# Patient Record
Sex: MALE | Race: WHITE
[De-identification: names, ages, dates, MRNs, and addresses within clinical notes are randomized per-mention and may not be internally consistent; named-entity substitution may affect disease eponyms.]

---

## 2019-03-27 ENCOUNTER — HOSPITAL ENCOUNTER (EMERGENCY)
Dept: HOSPITAL 62 - ER | Age: 59
LOS: 1 days | Discharge: HOME | End: 2019-03-28
Payer: COMMERCIAL

## 2019-03-27 DIAGNOSIS — N13.2: Primary | ICD-10-CM

## 2019-03-27 DIAGNOSIS — R10.9: ICD-10-CM

## 2019-03-27 DIAGNOSIS — R00.0: ICD-10-CM

## 2019-03-27 DIAGNOSIS — I10: ICD-10-CM

## 2019-03-27 DIAGNOSIS — R61: ICD-10-CM

## 2019-03-27 LAB
ADD MANUAL DIFF: NO
ALBUMIN SERPL-MCNC: 4 G/DL (ref 3.5–5)
ALP SERPL-CCNC: 69 U/L (ref 38–126)
ALT SERPL-CCNC: 37 U/L (ref 21–72)
ANION GAP SERPL CALC-SCNC: 11 MMOL/L (ref 5–19)
APPEARANCE UR: CLEAR
APTT PPP: YELLOW S
AST SERPL-CCNC: 24 U/L (ref 17–59)
BASOPHILS # BLD AUTO: 0.1 10^3/UL (ref 0–0.2)
BASOPHILS NFR BLD AUTO: 0.7 % (ref 0–2)
BILIRUB DIRECT SERPL-MCNC: 0.3 MG/DL (ref 0–0.4)
BILIRUB SERPL-MCNC: 0.4 MG/DL (ref 0.2–1.3)
BILIRUB UR QL STRIP: NEGATIVE
BUN SERPL-MCNC: 23 MG/DL (ref 7–20)
CALCIUM: 8.9 MG/DL (ref 8.4–10.2)
CHLORIDE SERPL-SCNC: 106 MMOL/L (ref 98–107)
CO2 SERPL-SCNC: 21 MMOL/L (ref 22–30)
EOSINOPHIL # BLD AUTO: 0.2 10^3/UL (ref 0–0.6)
EOSINOPHIL NFR BLD AUTO: 2.1 % (ref 0–6)
ERYTHROCYTE [DISTWIDTH] IN BLOOD BY AUTOMATED COUNT: 13.2 % (ref 11.5–14)
GLUCOSE SERPL-MCNC: 100 MG/DL (ref 75–110)
GLUCOSE UR STRIP-MCNC: NEGATIVE MG/DL
HCT VFR BLD CALC: 38.8 % (ref 37.9–51)
HGB BLD-MCNC: 13.3 G/DL (ref 13.5–17)
KETONES UR STRIP-MCNC: 20 MG/DL
LIPASE SERPL-CCNC: 187.3 U/L (ref 23–300)
LYMPHOCYTES # BLD AUTO: 2 10^3/UL (ref 0.5–4.7)
LYMPHOCYTES NFR BLD AUTO: 18.7 % (ref 13–45)
MCH RBC QN AUTO: 30.3 PG (ref 27–33.4)
MCHC RBC AUTO-ENTMCNC: 34.3 G/DL (ref 32–36)
MCV RBC AUTO: 88 FL (ref 80–97)
MONOCYTES # BLD AUTO: 0.9 10^3/UL (ref 0.1–1.4)
MONOCYTES NFR BLD AUTO: 8.2 % (ref 3–13)
NEUTROPHILS # BLD AUTO: 7.6 10^3/UL (ref 1.7–8.2)
NEUTS SEG NFR BLD AUTO: 70.3 % (ref 42–78)
NITRITE UR QL STRIP: NEGATIVE
PH UR STRIP: 5 [PH] (ref 5–9)
PLATELET # BLD: 260 10^3/UL (ref 150–450)
POTASSIUM SERPL-SCNC: 3.7 MMOL/L (ref 3.6–5)
PROT SERPL-MCNC: 7.4 G/DL (ref 6.3–8.2)
PROT UR STRIP-MCNC: NEGATIVE MG/DL
RBC # BLD AUTO: 4.39 10^6/UL (ref 4.35–5.55)
SODIUM SERPL-SCNC: 138.4 MMOL/L (ref 137–145)
SP GR UR STRIP: 1.03
TOTAL CELLS COUNTED % (AUTO): 100 %
UROBILINOGEN UR-MCNC: NEGATIVE MG/DL (ref ?–2)
WBC # BLD AUTO: 10.9 10^3/UL (ref 4–10.5)

## 2019-03-27 PROCEDURE — 74176 CT ABD & PELVIS W/O CONTRAST: CPT

## 2019-03-27 PROCEDURE — 36415 COLL VENOUS BLD VENIPUNCTURE: CPT

## 2019-03-27 PROCEDURE — 96361 HYDRATE IV INFUSION ADD-ON: CPT

## 2019-03-27 PROCEDURE — 99284 EMERGENCY DEPT VISIT MOD MDM: CPT

## 2019-03-27 PROCEDURE — 96374 THER/PROPH/DIAG INJ IV PUSH: CPT

## 2019-03-27 PROCEDURE — 96376 TX/PRO/DX INJ SAME DRUG ADON: CPT

## 2019-03-27 PROCEDURE — 83690 ASSAY OF LIPASE: CPT

## 2019-03-27 PROCEDURE — 96375 TX/PRO/DX INJ NEW DRUG ADDON: CPT

## 2019-03-27 PROCEDURE — 81001 URINALYSIS AUTO W/SCOPE: CPT

## 2019-03-27 PROCEDURE — 80053 COMPREHEN METABOLIC PANEL: CPT

## 2019-03-27 PROCEDURE — 85025 COMPLETE CBC W/AUTO DIFF WBC: CPT

## 2019-03-27 RX ADMIN — SODIUM CHLORIDE PRN MLS/HR: 9 INJECTION, SOLUTION INTRAVENOUS at 19:38

## 2019-03-27 RX ADMIN — SODIUM CHLORIDE PRN MLS/HR: 9 INJECTION, SOLUTION INTRAVENOUS at 18:53

## 2019-03-27 NOTE — RADIOLOGY REPORT (SQ)
EXAM DESCRIPTION:  CT ABD/PELVIS NO ORAL OR IV



COMPLETED DATE/TIME:  3/27/2019 7:29 pm



REASON FOR STUDY:  right flank pain



COMPARISON:  None.



TECHNIQUE:  CT scan of the abdomen and pelvis performed without intravenous or oral contrast. Images 
reviewed with lung, soft tissue, and bone windows. Reconstructed coronal and sagittal MPR images revi
ewed. All images stored on PACS.

All CT scanners at this facility use dose modulation, iterative reconstruction, and/or weight based d
osing when appropriate to reduce radiation dose to as low as reasonably achievable (ALARA).

[facility]: Dose Right  [facility]: CareDose    [facility]: Dose Right    [facility]: Teradose 4D    [facility]: Smart Marquee



RADIATION DOSE:  CT Rad equipment meets quality standard of care and radiation dose reduction techniq
ues were employed. CTDIvol: 8.5 mGy. DLP: 482 mGy-cm.mGy.



LIMITATIONS:  None.



FINDINGS:  LOWER CHEST: No significant findings. No nodules or infiltrates.

NON-CONTRASTED LIVER, SPLEEN, ADRENALS: Evaluation limited by lack of IV contrast. No identified sign
ificant masses.

PANCREAS: No masses. No peripancreatic inflammatory changes.

GALLBLADDER: No calcified stones. No inflammatory changes to suggest cholecystitis.

RIGHT KIDNEY AND URETER: No cysts identified. No solid masses.  5 mm calcified stone in the bladder p
ortion of the distal right ureter with mild-moderate hydronephrosis - hydroureter.

LEFT KIDNEY AND URETER: No cysts identified. No solid masses. No calcified stones. No hydronephrosis 
or hydroureter.

AORTA AND RETROPERITONEUM: No aneurysm. No retroperitoneal masses or adenopathy.

BOWEL AND PERITONEAL CAVITY: No obvious masses or inflammatory changes. No free fluid.

APPENDIX: Normal.

PELVIS, BLADDER, AND ABDOMINAL WALL:No abnormal masses. No free fluid. 5 mm calcified stone in the bl
adder portion of the distal right ureter with mild-moderate hydronephrosis - hydroureter..

BONES: No acute findings.

OTHER: No other significant finding.



IMPRESSION:  5 mm calcified stone in the bladder portion of the distal right ureter with mild-moderat
e hydronephrosis - hydroureter.



TECHNICAL DOCUMENTATION:  JOB ID:  [id number]

[id number]

Quality ID # [id number]: Final reports with documentation of one or more dose reduction techniques (e.g., Au
tomated exposure control, adjustment of the mA and/or kV according to patient size, use of iterative 
reconstruction technique)

 2011 [employer]- All Rights Reserved



Reading location - IP/workstation name: Project [email or web address]

## 2019-03-27 NOTE — ER DOCUMENT REPORT
ED General





- General


Chief Complaint: Flank Pain


Stated Complaint: FLANK PAIN


Time Seen by Provider: 03/27/19 18:36


Primary Care Provider: 


IKER STEEL MD [Primary Care Provider] - Follow up as needed


Mode of Arrival: Ambulatory


Information source: Patient


Notes: 





This is a 59-year-old man with no medical history presents to the emergency room

with acute right flank pain.  Patient is visibly uncomfortable and is unable to 

maintain a position of comfort.  He is hypertensive at a blood pressure of 

157/100 and is tachycardic at 112.





- HPI


Onset: Just prior to arrival


Onset/Duration: Sudden


Quality of pain: Dull


Severity: Severe


Pain Level: 5


Associated symptoms: denies: Chest pain, Fever, Shortness of breath


Exacerbated by: Denies


Relieved by: Denies


Similar symptoms previously: Yes


Recently seen / treated by doctor: No





- Related Data


Allergies/Adverse Reactions: 


                                        





No Known Allergies Allergy (Verified 03/27/19 18:29)


   











Past Medical History





- General


Information source: Patient





- Social History


Smoking Status: Never Smoker


Cigarette use (# per day): No


Chew tobacco use (# tins/day): No


Frequency of alcohol use: Rare


Drug Abuse: None


Lives with: Family


Family History: None


Patient has suicidal ideation: No


Patient has homicidal ideation: No





- Past Medical History


Cardiac Medical History: 


   Denies: Hx Heart Attack, Hx Hypertension


Pulmonary Medical History: 


   Denies: Hx Asthma


Neurological Medical History: Denies: Hx Cerebrovascular Accident, Hx Seizures


Renal/ Medical History: Denies: Hx Peritoneal Dialysis


GI Medical History: Denies: Hx Hepatitis, Hx Hiatal Hernia, Hx Ulcer


Infectious Medical History: Denies: Hx Hepatitis


Past Surgical History: Denies: Hx Open Heart Surgery, Hx Pacemaker





Review of Systems





- Review of Systems


Constitutional: denies: Chills, Fever


EENT: No symptoms reported


Cardiovascular: No symptoms reported


Respiratory: No symptoms reported


Gastrointestinal: No symptoms reported


Genitourinary: See HPI


Male Genitourinary: No symptoms reported


Musculoskeletal: No symptoms reported


Skin: No symptoms reported


Hematologic/Lymphatic: No symptoms reported


Neurological/Psychological: No symptoms reported





Physical Exam





- Vital signs


Vitals: 


                                        











Temp Pulse Resp BP Pulse Ox


 


 97.7 F   112 H  28 H  157/100 H  99 


 


 03/27/19 18:31  03/27/19 18:31  03/27/19 18:31  03/27/19 18:31  03/27/19 18:31











Notes: 





Physical exam:


 


GENERAL: Tachycardic and hypertensive, diaphoretic.  In distress.





HEAD: Atraumatic, normocephalic.





EYES: Pupils equal round and reactive to light, extraocular movements intact, 

sclera anicteric, conjunctiva are normal.





ENT: TMs normal, nares patent, oropharynx clear without exudates.  Moist mucous 

membranes.





NECK: Normal range of motion, supple without obvious mass or JVD.





LUNGS: Breath sounds clear to auscultation bilaterally and equal.  No wheezes 

rales or rhonchi.





HEART: Regular rate and rhythm without murmurs, rubs or gallops.





ABDOMEN: Soft, normoactive bowel sounds.  No tenderness to palpation.  No 

guarding, no rebound.  No masses appreciated.





Testes x2 nontender.  No inguinal lymphadenopathy





Flank: Right CVA tenderness.





EXTREMITIES: Normal range of motion, no pitting or edema.  No clubbing or 

cyanosis.





NEUROLOGICAL: Cranial nerves II through XII grossly intact.  Normal speech, 

moving all extremities.





PSYCH: Normal mood, normal affect.





SKIN: Warm, Dry, normal turgor, no rashes or lesions noted.








Side ultrasound: Right hydronephrosis, normal aorta.





Course





- Re-evaluation


Re-evalutation: 





03/28/19 04:26


CT shows a 5 mm right ureter stone.  He was given IV fluids, Flomax, IV pain 

medicine, IV Toradol, IV antiemetics.  At 5 mm, stone is more likely the past.  

The urine shows no infection.  Labs have otherwise been okay.  Plan will be 

following up with the urologist.  Patient was given a copy of all of the blood 

work as well as the formal x-ray report as well as the CT on disc.


03/28/19 04:27








- Vital Signs


Vital signs: 


                                        











Temp Pulse Resp BP Pulse Ox


 


 98.2 F   79   16   141/82 H  96 


 


 03/28/19 01:30  03/28/19 01:30  03/28/19 01:30  03/28/19 01:30  03/28/19 01:30














- Laboratory


Result Diagrams: 


                                 03/27/19 19:11





                                 03/27/19 19:11


Laboratory results interpreted by me: 


                                        











  03/27/19 03/27/19 03/27/19





  19:11 19:11 22:03


 


WBC  10.9 H  


 


Hgb  13.3 L  


 


Carbon Dioxide   21 L 


 


BUN   23 H 


 


Creatinine   1.29 H 


 


Est GFR (Non-Af Amer)   57 L 


 


Urine Ketones    20 H


 


Urine Blood    MODERATE H














- Diagnostic Test


Radiology reviewed: Image reviewed, Reports reviewed - CT shows a right ureter 

stone.





Discharge





- Discharge


Clinical Impression: 


 Kidney stone





Condition: Stable


Disposition: HOME, SELF-CARE


Additional Instructions: 


Rest, drink plenty of fluids.


Take pain medicine as prescribed.


Take the nausea medicine as needed.


I want you to call the urologist office in the morning and tell them that you 

have a fairly large stone and that the ER doctor wanted you seen by the 

urologist soon.


Keep in mind, if you do get worsening pain and have to come back to the ER, the 

urologist works out of [place] ([facility]).


A copy of today's labs and CT report with you as well as the CT on disc when you

see the urologist.





The pain medicine you're taking prescribed as a narcotic.  There are several 

important things you should know about this medicine:





1.  This medicine contains Tylenol: It is important that you do not take Tylenol

(or acetaminophen) while on this medicine.  


Tylenol is metabolized by the liver and taking too much Tylenol (acetaminophen) 

can lay to liver damage and even liver failure.





2.  Taking narcotics for too long can lead to physical and mental dependence.  

Take this medicine only if really needed and in the lowest quantity to achieve p

ain relief.





3.  Do not drink alcohol while on this medicine.  Alcohol interacts with 

narcotics and the combination can be dangerous.





4.  Do not drive or operate machinery while on this medicine.





5.  Narcotics do cause constipation, so drink plenty of fluids and daily stool 

softeners.











It is recommended that you follow-up with a urologist:


 


[facility]


[place] Office


[address]


[place], [place]


[phone]


 


[place] Office


[address]


[place], [place]


[phone] 


 


Prescriptions: 


Ondansetron HCl [Zofran 4 mg Tablet] 1 - 2 tab PO Q4H PRN #10 tablet


 PRN Reason: 


Oxycodone HCl/Acetaminophen [Percocet 5-325 mg Tablet] 1 - 2 tab PO ASDIR PRN 

#25 tablet


 PRN Reason: 


Tamsulosin HCl [Flomax 0.4 mg Cap.sr] 0.4 mg PO DAILY #7 cap.sr.24h


Forms:  Return to Work


Referrals: 


IKER STEEL MD [Primary Care Provider] - Follow up as needed

## 2019-03-28 VITALS — DIASTOLIC BLOOD PRESSURE: 82 MMHG | SYSTOLIC BLOOD PRESSURE: 141 MMHG
